# Patient Record
Sex: MALE | Race: WHITE | Employment: OTHER | URBAN - METROPOLITAN AREA
[De-identification: names, ages, dates, MRNs, and addresses within clinical notes are randomized per-mention and may not be internally consistent; named-entity substitution may affect disease eponyms.]

---

## 2019-02-12 ENCOUNTER — HOSPITAL ENCOUNTER (EMERGENCY)
Age: 79
Discharge: HOME | End: 2019-02-12
Payer: MEDICARE

## 2019-02-12 VITALS
TEMPERATURE: 98.1 F | OXYGEN SATURATION: 95 % | RESPIRATION RATE: 16 BRPM | WEIGHT: 220 LBS | SYSTOLIC BLOOD PRESSURE: 173 MMHG | DIASTOLIC BLOOD PRESSURE: 88 MMHG | BODY MASS INDEX: 28.23 KG/M2 | HEART RATE: 58 BPM | HEIGHT: 74 IN

## 2019-02-12 DIAGNOSIS — I10 ESSENTIAL HYPERTENSION: Primary | ICD-10-CM

## 2019-02-12 LAB
A/G RATIO: 1.2 (ref 1.1–2.2)
ALBUMIN SERPL-MCNC: 4.1 G/DL (ref 3.4–5)
ALP BLD-CCNC: 28 U/L (ref 40–129)
ALT SERPL-CCNC: 15 U/L (ref 10–40)
ANION GAP SERPL CALCULATED.3IONS-SCNC: 12 MMOL/L (ref 3–16)
AST SERPL-CCNC: 19 U/L (ref 15–37)
BASOPHILS ABSOLUTE: 0 K/UL (ref 0–0.2)
BASOPHILS RELATIVE PERCENT: 0.3 %
BILIRUB SERPL-MCNC: 0.5 MG/DL (ref 0–1)
BUN BLDV-MCNC: 17 MG/DL (ref 7–20)
CALCIUM SERPL-MCNC: 8.9 MG/DL (ref 8.3–10.6)
CHLORIDE BLD-SCNC: 104 MMOL/L (ref 99–110)
CO2: 22 MMOL/L (ref 21–32)
CREAT SERPL-MCNC: 0.9 MG/DL (ref 0.8–1.3)
EOSINOPHILS ABSOLUTE: 0 K/UL (ref 0–0.6)
EOSINOPHILS RELATIVE PERCENT: 0.6 %
GFR AFRICAN AMERICAN: >60
GFR NON-AFRICAN AMERICAN: >60
GLOBULIN: 3.3 G/DL
GLUCOSE BLD-MCNC: 106 MG/DL (ref 70–99)
HCT VFR BLD CALC: 45.2 % (ref 40.5–52.5)
HEMOGLOBIN: 15.3 G/DL (ref 13.5–17.5)
LYMPHOCYTES ABSOLUTE: 1.2 K/UL (ref 1–5.1)
LYMPHOCYTES RELATIVE PERCENT: 18.6 %
MCH RBC QN AUTO: 30.8 PG (ref 26–34)
MCHC RBC AUTO-ENTMCNC: 33.9 G/DL (ref 31–36)
MCV RBC AUTO: 90.6 FL (ref 80–100)
MONOCYTES ABSOLUTE: 0.4 K/UL (ref 0–1.3)
MONOCYTES RELATIVE PERCENT: 6.3 %
NEUTROPHILS ABSOLUTE: 5 K/UL (ref 1.7–7.7)
NEUTROPHILS RELATIVE PERCENT: 74.2 %
PDW BLD-RTO: 13.9 % (ref 12.4–15.4)
PLATELET # BLD: 192 K/UL (ref 135–450)
PMV BLD AUTO: 8.7 FL (ref 5–10.5)
POTASSIUM SERPL-SCNC: 4.1 MMOL/L (ref 3.5–5.1)
RBC # BLD: 4.99 M/UL (ref 4.2–5.9)
SODIUM BLD-SCNC: 138 MMOL/L (ref 136–145)
TOTAL PROTEIN: 7.4 G/DL (ref 6.4–8.2)
WBC # BLD: 6.7 K/UL (ref 4–11)

## 2019-02-12 PROCEDURE — 6370000000 HC RX 637 (ALT 250 FOR IP): Performed by: NURSE PRACTITIONER

## 2019-02-12 PROCEDURE — 80053 COMPREHEN METABOLIC PANEL: CPT

## 2019-02-12 PROCEDURE — 99284 EMERGENCY DEPT VISIT MOD MDM: CPT

## 2019-02-12 PROCEDURE — 93005 ELECTROCARDIOGRAM TRACING: CPT | Performed by: EMERGENCY MEDICINE

## 2019-02-12 PROCEDURE — 85025 COMPLETE CBC W/AUTO DIFF WBC: CPT

## 2019-02-12 RX ORDER — LISINOPRIL 10 MG/1
10 TABLET ORAL ONCE
Status: COMPLETED | OUTPATIENT
Start: 2019-02-12 | End: 2019-02-12

## 2019-02-12 RX ORDER — ACETAMINOPHEN 325 MG/1
650 TABLET ORAL ONCE
Status: COMPLETED | OUTPATIENT
Start: 2019-02-12 | End: 2019-02-12

## 2019-02-12 RX ORDER — LISINOPRIL 10 MG/1
10 TABLET ORAL DAILY
Qty: 30 TABLET | Refills: 0 | Status: SHIPPED | OUTPATIENT
Start: 2019-02-12

## 2019-02-12 RX ADMIN — LISINOPRIL 10 MG: 10 TABLET ORAL at 14:34

## 2019-02-12 RX ADMIN — ACETAMINOPHEN 650 MG: 325 TABLET ORAL at 14:34

## 2019-02-12 ASSESSMENT — PAIN SCALES - GENERAL
PAINLEVEL_OUTOF10: 0
PAINLEVEL_OUTOF10: 5
PAINLEVEL_OUTOF10: 5

## 2019-02-12 ASSESSMENT — PAIN DESCRIPTION - LOCATION: LOCATION: HEAD

## 2019-02-12 ASSESSMENT — PAIN DESCRIPTION - PAIN TYPE: TYPE: ACUTE PAIN

## 2019-02-13 LAB
EKG ATRIAL RATE: 65 BPM
EKG DIAGNOSIS: NORMAL
EKG P AXIS: 51 DEGREES
EKG P-R INTERVAL: 172 MS
EKG Q-T INTERVAL: 454 MS
EKG QRS DURATION: 108 MS
EKG QTC CALCULATION (BAZETT): 472 MS
EKG R AXIS: -33 DEGREES
EKG T AXIS: -25 DEGREES
EKG VENTRICULAR RATE: 65 BPM

## 2019-02-13 PROCEDURE — 93010 ELECTROCARDIOGRAM REPORT: CPT | Performed by: INTERNAL MEDICINE

## 2021-08-11 ENCOUNTER — APPOINTMENT (OUTPATIENT)
Dept: CT IMAGING | Age: 81
DRG: 641 | End: 2021-08-11
Payer: MEDICARE

## 2021-08-11 ENCOUNTER — HOSPITAL ENCOUNTER (INPATIENT)
Age: 81
LOS: 1 days | Discharge: HOME | DRG: 641 | End: 2021-08-12
Attending: EMERGENCY MEDICINE | Admitting: INTERNAL MEDICINE
Payer: MEDICARE

## 2021-08-11 ENCOUNTER — APPOINTMENT (OUTPATIENT)
Dept: GENERAL RADIOLOGY | Age: 81
DRG: 641 | End: 2021-08-11
Payer: MEDICARE

## 2021-08-11 DIAGNOSIS — R00.1 BRADYCARDIA: ICD-10-CM

## 2021-08-11 DIAGNOSIS — I71.21 ASCENDING AORTIC ANEURYSM: ICD-10-CM

## 2021-08-11 DIAGNOSIS — R11.2 NON-INTRACTABLE VOMITING WITH NAUSEA, UNSPECIFIED VOMITING TYPE: ICD-10-CM

## 2021-08-11 DIAGNOSIS — R55 NEAR SYNCOPE: Primary | ICD-10-CM

## 2021-08-11 DIAGNOSIS — I71.40 ABDOMINAL AORTIC ANEURYSM (AAA) WITHOUT RUPTURE: ICD-10-CM

## 2021-08-11 LAB
ANION GAP SERPL CALCULATED.3IONS-SCNC: 15 MMOL/L (ref 3–16)
BASOPHILS ABSOLUTE: 0 K/UL (ref 0–0.2)
BASOPHILS RELATIVE PERCENT: 0.5 %
BUN BLDV-MCNC: 24 MG/DL (ref 7–20)
CALCIUM SERPL-MCNC: 9.7 MG/DL (ref 8.3–10.6)
CHLORIDE BLD-SCNC: 104 MMOL/L (ref 99–110)
CO2: 19 MMOL/L (ref 21–32)
CREAT SERPL-MCNC: 1.3 MG/DL (ref 0.8–1.3)
EKG ATRIAL RATE: 53 BPM
EKG DIAGNOSIS: NORMAL
EKG P AXIS: 9 DEGREES
EKG P-R INTERVAL: 182 MS
EKG Q-T INTERVAL: 454 MS
EKG QRS DURATION: 110 MS
EKG QTC CALCULATION (BAZETT): 426 MS
EKG R AXIS: -26 DEGREES
EKG T AXIS: -3 DEGREES
EKG VENTRICULAR RATE: 53 BPM
EOSINOPHILS ABSOLUTE: 0.1 K/UL (ref 0–0.6)
EOSINOPHILS RELATIVE PERCENT: 1.8 %
GFR AFRICAN AMERICAN: >60
GFR NON-AFRICAN AMERICAN: 53
GLUCOSE BLD-MCNC: 122 MG/DL (ref 70–99)
HCT VFR BLD CALC: 46.4 % (ref 40.5–52.5)
HEMOGLOBIN: 16.1 G/DL (ref 13.5–17.5)
LYMPHOCYTES ABSOLUTE: 2 K/UL (ref 1–5.1)
LYMPHOCYTES RELATIVE PERCENT: 31.4 %
MAGNESIUM: 2 MG/DL (ref 1.8–2.4)
MCH RBC QN AUTO: 30.7 PG (ref 26–34)
MCHC RBC AUTO-ENTMCNC: 34.7 G/DL (ref 31–36)
MCV RBC AUTO: 88.6 FL (ref 80–100)
MONOCYTES ABSOLUTE: 0.6 K/UL (ref 0–1.3)
MONOCYTES RELATIVE PERCENT: 9.2 %
NEUTROPHILS ABSOLUTE: 3.7 K/UL (ref 1.7–7.7)
NEUTROPHILS RELATIVE PERCENT: 57.1 %
PDW BLD-RTO: 13.9 % (ref 12.4–15.4)
PLATELET # BLD: 230 K/UL (ref 135–450)
PMV BLD AUTO: 8.6 FL (ref 5–10.5)
POTASSIUM SERPL-SCNC: 3.8 MMOL/L (ref 3.5–5.1)
RBC # BLD: 5.24 M/UL (ref 4.2–5.9)
SODIUM BLD-SCNC: 138 MMOL/L (ref 136–145)
SPECIMEN STATUS: NORMAL
TROPONIN: <0.01 NG/ML
TROPONIN: <0.01 NG/ML
WBC # BLD: 6.5 K/UL (ref 4–11)

## 2021-08-11 PROCEDURE — 96374 THER/PROPH/DIAG INJ IV PUSH: CPT

## 2021-08-11 PROCEDURE — 1200000000 HC SEMI PRIVATE

## 2021-08-11 PROCEDURE — 85025 COMPLETE CBC W/AUTO DIFF WBC: CPT

## 2021-08-11 PROCEDURE — 80048 BASIC METABOLIC PNL TOTAL CA: CPT

## 2021-08-11 PROCEDURE — 84484 ASSAY OF TROPONIN QUANT: CPT

## 2021-08-11 PROCEDURE — 36415 COLL VENOUS BLD VENIPUNCTURE: CPT

## 2021-08-11 PROCEDURE — 2700000000 HC OXYGEN THERAPY PER DAY

## 2021-08-11 PROCEDURE — 6360000004 HC RX CONTRAST MEDICATION: Performed by: EMERGENCY MEDICINE

## 2021-08-11 PROCEDURE — 74174 CTA ABD&PLVS W/CONTRAST: CPT

## 2021-08-11 PROCEDURE — 2580000003 HC RX 258: Performed by: INTERNAL MEDICINE

## 2021-08-11 PROCEDURE — 99285 EMERGENCY DEPT VISIT HI MDM: CPT

## 2021-08-11 PROCEDURE — 6360000002 HC RX W HCPCS: Performed by: EMERGENCY MEDICINE

## 2021-08-11 PROCEDURE — 93005 ELECTROCARDIOGRAM TRACING: CPT | Performed by: EMERGENCY MEDICINE

## 2021-08-11 PROCEDURE — 71045 X-RAY EXAM CHEST 1 VIEW: CPT

## 2021-08-11 PROCEDURE — 83735 ASSAY OF MAGNESIUM: CPT

## 2021-08-11 PROCEDURE — 94761 N-INVAS EAR/PLS OXIMETRY MLT: CPT

## 2021-08-11 PROCEDURE — 2580000003 HC RX 258: Performed by: EMERGENCY MEDICINE

## 2021-08-11 PROCEDURE — 6360000002 HC RX W HCPCS: Performed by: INTERNAL MEDICINE

## 2021-08-11 PROCEDURE — 93010 ELECTROCARDIOGRAM REPORT: CPT | Performed by: INTERNAL MEDICINE

## 2021-08-11 RX ORDER — SODIUM CHLORIDE 9 MG/ML
INJECTION, SOLUTION INTRAVENOUS CONTINUOUS
Status: DISCONTINUED | OUTPATIENT
Start: 2021-08-11 | End: 2021-08-12 | Stop reason: HOSPADM

## 2021-08-11 RX ORDER — ACETAMINOPHEN 650 MG/1
650 SUPPOSITORY RECTAL EVERY 6 HOURS PRN
Status: DISCONTINUED | OUTPATIENT
Start: 2021-08-11 | End: 2021-08-12 | Stop reason: HOSPADM

## 2021-08-11 RX ORDER — 0.9 % SODIUM CHLORIDE 0.9 %
1000 INTRAVENOUS SOLUTION INTRAVENOUS ONCE
Status: COMPLETED | OUTPATIENT
Start: 2021-08-11 | End: 2021-08-11

## 2021-08-11 RX ORDER — ONDANSETRON 2 MG/ML
4 INJECTION INTRAMUSCULAR; INTRAVENOUS ONCE
Status: COMPLETED | OUTPATIENT
Start: 2021-08-11 | End: 2021-08-11

## 2021-08-11 RX ORDER — ONDANSETRON 4 MG/1
4 TABLET, ORALLY DISINTEGRATING ORAL EVERY 8 HOURS PRN
Status: DISCONTINUED | OUTPATIENT
Start: 2021-08-11 | End: 2021-08-12 | Stop reason: HOSPADM

## 2021-08-11 RX ORDER — ASPIRIN 81 MG/1
81 TABLET ORAL DAILY
Status: DISCONTINUED | OUTPATIENT
Start: 2021-08-11 | End: 2021-08-12 | Stop reason: HOSPADM

## 2021-08-11 RX ORDER — ACETAMINOPHEN 325 MG/1
650 TABLET ORAL EVERY 6 HOURS PRN
Status: DISCONTINUED | OUTPATIENT
Start: 2021-08-11 | End: 2021-08-12 | Stop reason: HOSPADM

## 2021-08-11 RX ORDER — FENOFIBRATE 54 MG/1
54 TABLET ORAL DAILY
Status: DISCONTINUED | OUTPATIENT
Start: 2021-08-11 | End: 2021-08-12 | Stop reason: HOSPADM

## 2021-08-11 RX ORDER — ONDANSETRON 2 MG/ML
4 INJECTION INTRAMUSCULAR; INTRAVENOUS EVERY 6 HOURS PRN
Status: DISCONTINUED | OUTPATIENT
Start: 2021-08-11 | End: 2021-08-12 | Stop reason: HOSPADM

## 2021-08-11 RX ORDER — POLYETHYLENE GLYCOL 3350 17 G/17G
17 POWDER, FOR SOLUTION ORAL DAILY PRN
Status: DISCONTINUED | OUTPATIENT
Start: 2021-08-11 | End: 2021-08-12 | Stop reason: HOSPADM

## 2021-08-11 RX ADMIN — SODIUM CHLORIDE 1000 ML: 9 INJECTION, SOLUTION INTRAVENOUS at 14:03

## 2021-08-11 RX ADMIN — IOPAMIDOL 75 ML: 755 INJECTION, SOLUTION INTRAVENOUS at 15:22

## 2021-08-11 RX ADMIN — SODIUM CHLORIDE: 9 INJECTION, SOLUTION INTRAVENOUS at 21:23

## 2021-08-11 RX ADMIN — ONDANSETRON 4 MG: 2 INJECTION INTRAMUSCULAR; INTRAVENOUS at 14:02

## 2021-08-11 RX ADMIN — ENOXAPARIN SODIUM 40 MG: 40 INJECTION SUBCUTANEOUS at 21:28

## 2021-08-11 ASSESSMENT — PAIN SCALES - GENERAL: PAINLEVEL_OUTOF10: 0

## 2021-08-11 NOTE — ED NOTES
Pt arrives from EMS for low HR and dizziness. Pt vomiting upon arrival. Pt states he feels dizzy like he is going to pass out. Pt denies any pain. Pt states nausea. Pt alert and oriented.       Petrona Alonso RN  08/11/21 7608

## 2021-08-11 NOTE — ED NOTES
Bed: 01  Expected date:   Expected time:   Means of arrival: [facility]  Comments:  Medic PStephaniOStephani [address], [facility]  08/11/21 2542

## 2021-08-11 NOTE — H&P
Hospital Medicine History & Physical      PCP: Carmencita Victor MD    Date of Admission: 8/11/2021    Date of Service: Pt seen/examined on 8/11/2021 and Admitted to Inpatient with expected LOS greater than two midnights due to medical therapy. Chief Complaint: Syncope      History Of Present Illness:  (    78 y.o. male who presented to [facility] with above complaint. He told he used to faint younger days after seeing unpleasant things like blood. Last syncopal episode was 20 years ago. While he was having lunch at outside he felt that he was going to pass out. Once he opened eyes he was almost getting transferred into the ambulance. Currently he does not have any headache dizziness facial asymmetry, focal neurological weakness chest pain palpitation nausea vomiting or diarrhea. But he vomited once in the ER. Past Medical History:          Diagnosis Date    Hyperlipidemia        Past Surgical History:          Procedure Laterality Date    COLONOSCOPY  2008    COLONOSCOPY  11/13    no specimen       Medications Prior to Admission:      Prior to Admission medications    Medication Sig Start Date End Date Taking? Authorizing Provider   lisinopril (PRINIVIL;ZESTRIL) 10 MG tablet Take 1 tablet by mouth daily 2/12/19   Daniela Bar APRN - CNP   fenofibrate (TRICOR) 145 MG tablet Take 145 mg by mouth daily. Historical Provider, MD   Omega-3 Fatty Acids (FISH OIL) 1200 MG CAPS Take 2 tablets by mouth 2 times daily. Historical Provider, MD   Glucosamine 750 MG TABS Take 1 tablet by mouth 2 times daily. Historical Provider, MD   Multiple Vitamins-Minerals (CENTRUM SILVER PO) Take 1 tablet by mouth daily. Historical Provider, MD   Cholecalciferol (VITAMIN D3) 3000 UNITS TABS Take 1 tablet by mouth daily. Historical Provider, MD   Loratadine 10 MG CAPS Take 1 capsule by mouth daily. Historical Provider, MD   aspirin 81 MG EC tablet Take 81 mg by mouth daily.     Historical Provider, MD       Allergies:  Patient has no known allergies. Social History:      The patient currently lives at home with wife    TOBACCO:   reports that he has never smoked. He does not have any smokeless tobacco history on file. ETOH:   reports no history of alcohol use. E-Cigarettes/Vaping Use     Questions Responses    E-Cigarette/Vaping Use     Start Date     Passive Exposure     Quit Date     Counseling Given     Comments             Family History:      Reviewed in detail and negative for DM, CAD, Cancer, CVA. Positive as follows:        Problem Relation Age of Onset   [facility] Cancer Mother         colon ca       REVIEW OF SYSTEMS COMPLETED:   Pertinent positives as noted in the HPI. All other systems reviewed and negative. PHYSICAL EXAM PERFORMED:    BP (!) 147/130   Pulse 69   Temp 97.5 °F (36.4 °C) (Oral)   Resp 14   Ht 6' 2\" (1.88 m)   Wt 200 lb (90.7 kg)   SpO2 98%   BMI 25.68 kg/m²     General appearance:  No apparent distress, appears stated age and cooperative. HEENT:  Normal cephalic, atraumatic without obvious deformity. Pupils equal, round, and reactive to light. Extra ocular muscles intact. Conjunctivae/corneas clear. Neck: Supple, with full range of motion. No jugular venous distention. Trachea midline. Respiratory:  Normal respiratory effort. Reduced air entry, bilaterally without Rales/Wheezes/Rhonchi. Cardiovascular:  Regular rate and rhythm with normal S1/S2 without murmurs, rubs or gallops. Abdomen: Soft, non-tender, non-distended with normal bowel sounds. Musculoskeletal:  No clubbing, cyanosis or edema bilaterally. Full range of motion without deformity. Skin: Skin color, texture, turgor normal.  No rashes or lesions. Neurologic:  Neurovascularly intact without any focal sensory/motor deficits.  Cranial nerves: II-XII intact, grossly non-focal.  Psychiatric:  Alert and oriented,   Capillary Refill: Brisk,3 seconds, normal  Peripheral Pulses: +2 palpable, equal bilaterally       Labs:     Recent Labs     08/11/21  1403   WBC 6.5   HGB 16.1   HCT 46.4        Recent Labs     08/11/21  1403      K 3.8      CO2 19*   BUN 24*   CREATININE 1.3   CALCIUM 9.7     No results for input(s): AST, ALT, BILIDIR, BILITOT, ALKPHOS in the last 72 hours. No results for input(s): INR in the last 72 hours. Recent Labs     08/11/21  1403   TROPONINI <0.01       Urinalysis:    No results found for: [place], BACTERIA, RBCUA, BLOODU, [address], GLUCOSEU    Radiology:     CXR: I have reviewed the CXR with the following interpretation:   EKG:  I have reviewed the EKG with the following interpretation: Bradycardia 53/min no acute ST changes    CTA CHEST ABDOMEN PELVIS W CONTRAST   Final Result   No aortic dissection. 4.1 cm saccular aneurysm of the abdominal aorta just above the bifurcation. 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 Dilation of the ascending aorta up to 4.7 cm      Recommend vascular surgical consultation for the above. XR CHEST PORTABLE   Final Result   No evidence of an acute cardiopulmonary process.              ASSESSMENT:    Active Hospital Problems    Diagnosis Date Noted    Syncope, near [R55] 08/11/2021         PLAN:  Syncope-possibly vasovagal and or  orthostasis/ cardio neurogenic causes need to be ruled out-admit, telemetry, troponin, echocardiogram, orthostasis, IV fluids, cardiology was consulted in the emergency room  Aspirin    Incidental finding of dilatation of ascending aorta, focal stenosis of proximal right subclavian artery and saccular aneurysm of abdominal aorta just above the bifurcation-vascular surgery was consulted from emergency room, input appreciated, all of the above were chronic and can be followed up as an outpatient    Acute kidney injury-creatinine 1.3 normally is less than 1-possibly prerenal hold nephrotoxic medications IV fluids and monitor BMP    Dyslipidemia-TriCor          DVT Prophylaxis: Lovenox  Diet: Low-cholesterol  Code Status: Full code    PT/OT Eval Status:     Dispo -admitted to [facility] 5 telemetry       Tena Landeros MD    Thank you Viky Hamilton MD for the opportunity to be involved in this patient's care. If you have any questions or concerns please feel free to contact me at [phone].

## 2021-08-11 NOTE — ED NOTES
Called vascular @ 1631   Re: aortic aneurysm.  near syncope   @ [address] [place] [address]  08/11/21 1522

## 2021-08-11 NOTE — ED PROVIDER NOTES
CHIEF COMPLAINT  Bradycardia (Pt reports he was eating lunch and felt sudden \"light headedness\" became sweaty and pale per family called EMS. Per EMS HR 45) and Nausea & Vomiting      HISTORY OF PRESENT ILLNESS  Clive Lloyd is a 78 y.o. male with a history of hyperlipidemia who presents to the ED complaining of near syncope. Patient was sitting eating lunch when he suddenly became extremely lightheaded, sweaty, and pale. Per EMS, heart rate was 45. Patient denies chest pain, or shortness of breath. No palpitations noted. No recent changes in medication. Patient had an episode of vomiting shortly after arrival in the emergency department. .   No other complaints, modifying factors or associated symptoms. I have reviewed the following from the nursing documentation. Past Medical History:   Diagnosis Date    Hyperlipidemia      Past Surgical History:   Procedure Laterality Date    COLONOSCOPY  2008    COLONOSCOPY  11/13    no specimen     Family History   Problem Relation Age of Onset    Cancer Mother         colon ca     Social History     Socioeconomic History    Marital status:      Spouse name: Not on file    Number of children: Not on file    Years of education: Not on file    Highest education level: Not on file   Occupational History    Not on file   Tobacco Use    Smoking status: Never Smoker   Substance and Sexual Activity    Alcohol use: No    Drug use: No    Sexual activity: Not on file   Other Topics Concern    Not on file   Social History Narrative    Not on file     Social Determinants of Health     Financial Resource Strain:     Difficulty of Paying Living Expenses:    Food Insecurity:     Worried About [address] in the Last Year:     [address] in the Last Year:    Transportation Needs:     Lack of Transportation (Medical):      Lack of Transportation (Non-Medical):    Physical Activity:     Days of Exercise per Week:     Minutes of Exercise per Session:    Stress:     Feeling of Stress :    Social Connections:     Frequency of Communication with Friends and Family:     Frequency of Social Gatherings with Friends and Family:     Attends Synagogue Services:     Active Member of Clubs or Organizations:     Attends Club or Organization Meetings:     Marital Status:    Intimate Partner Violence:     Fear of Current or Ex-Partner:     Emotionally Abused:     Physically Abused:     Sexually Abused:      Current Facility-Administered Medications   Medication Dose Route Frequency Provider Last Rate Last Admin    0.9 % sodium chloride bolus  1,000 mL Intravenous Once Jose De Jesus Bedoya DO 1,000 mL/hr at 08/11/21 1403 1,000 mL at 08/11/21 1403     Current Outpatient Medications   Medication Sig Dispense Refill    lisinopril (PRINIVIL;ZESTRIL) 10 MG tablet Take 1 tablet by mouth daily 30 tablet 0    fenofibrate (TRICOR) 145 MG tablet Take 145 mg by mouth daily.  Omega-3 Fatty Acids (FISH OIL) 1200 MG CAPS Take 2 tablets by mouth 2 times daily.  Glucosamine 750 MG TABS Take 1 tablet by mouth 2 times daily.  Multiple Vitamins-Minerals (CENTRUM SILVER PO) Take 1 tablet by mouth daily.  Cholecalciferol (VITAMIN D3) 3000 UNITS TABS Take 1 tablet by mouth daily.  Loratadine 10 MG CAPS Take 1 capsule by mouth daily.  aspirin 81 MG EC tablet Take 81 mg by mouth daily. No Known Allergies    REVIEW OF SYSTEMS  10 systems reviewed, pertinent positives per HPI otherwise noted to be negative. PHYSICAL EXAM  BP (!) 154/81   Pulse 54   Temp 97.5 °F (36.4 °C) (Oral)   Resp 16   Ht 6' 2\" (1.88 m)   Wt 200 lb (90.7 kg)   SpO2 95%   BMI 25.68 kg/m²   GENERAL APPEARANCE: Awake and alert. Cooperative. No acute distress. HEAD: Normocephalic. Atraumatic. EYES: PERRL. EOM's grossly intact. Test skew within normal limits. .  ENT: Mucous membranes are moist.   NECK: Supple, trachea midline. HEART: Bradycardic. Benedetta Ou Normal S1, S2.  No murmurs, rubs or gallops. LUNGS: Respirations unlabored. CTAB. Good air exchange. No wheezes, rales, or rhonchi. Speaking comfortably in full sentences. ABDOMEN: Soft. Non-distended. Non-tender. No guarding or rebound. Normal Bowel sounds. EXTREMITIES: No peripheral edema. MAEE. No acute deformities. SKIN: Warm and dry. No acute rashes. NEUROLOGICAL: Alert and oriented X 3. CN II-XII intact. No gross facial drooping. Strength 5/5, sensation intact. No pronator drift. Normal coordination. Gait not tested. [place] [facility]: Normal mood and affect. LABS  I have reviewed all labs for this visit.    Results for orders placed or performed during the hospital encounter of 08/11/21   CBC Auto Differential   Result Value Ref Range    WBC 6.5 4.0 - 11.0 K/uL    RBC 5.24 4.20 - 5.90 M/uL    Hemoglobin 16.1 13.5 - 17.5 g/dL    Hematocrit 46.4 40.5 - 52.5 %    MCV 88.6 80.0 - 100.0 fL    MCH 30.7 26.0 - 34.0 pg    MCHC 34.7 31.0 - 36.0 g/dL    RDW 13.9 12.4 - 15.4 %    Platelets 712 812 - 913 K/uL    MPV 8.6 5.0 - 10.5 fL    Neutrophils % 57.1 %    Lymphocytes % 31.4 %    Monocytes % 9.2 %    Eosinophils % 1.8 %    Basophils % 0.5 %    Neutrophils Absolute 3.7 1.7 - 7.7 K/uL    Lymphocytes Absolute 2.0 1.0 - 5.1 K/uL    Monocytes Absolute 0.6 0.0 - 1.3 K/uL    Eosinophils Absolute 0.1 0.0 - 0.6 K/uL    Basophils Absolute 0.0 0.0 - 0.2 K/uL   Basic Metabolic Panel   Result Value Ref Range    Sodium 138 136 - 145 mmol/L    Potassium 3.8 3.5 - 5.1 mmol/L    Chloride 104 99 - 110 mmol/L    CO2 19 (L) 21 - 32 mmol/L    Anion Gap 15 3 - 16    Glucose 122 (H) 70 - 99 mg/dL    BUN 24 (H) 7 - 20 mg/dL    CREATININE 1.3 0.8 - 1.3 mg/dL    GFR Non- 53 (A) >60    GFR African American >60 >60    Calcium 9.7 8.3 - 10.6 mg/dL   Magnesium   Result Value Ref Range    Magnesium 2.00 1.80 - 2.40 mg/dL   Troponin   Result Value Ref Range    Troponin <0.01 <0.01 ng/mL   Sample possible blood bank testing   Result Value Ref Range    Specimen Status HIEU    Troponin   Result Value Ref Range    Troponin <0.01 <0.01 ng/mL   EKG 12 Lead   Result Value Ref Range    Ventricular Rate 53 BPM    Atrial Rate 53 BPM    P-R Interval 182 ms    QRS Duration 110 ms    Q-T Interval 454 ms    QTc Calculation (Bazett) 426 ms    P Axis 9 degrees    R Axis -26 degrees    T Axis -3 degrees    Diagnosis       Baseline wanderSinus bradycardia with occasional Premature ventricular complexesIncomplete right bundle branch blockMinimal voltage criteria for LVH, may be normal variantMarked T wave abnormality, consider lateral ischemiaAbnormal ECGWhen compared with ECG of 12-FEB-2019 12:49,Premature ventricular complexes are now PresentConfirmed by Tahmina Bahena ([id number]) on 8/11/2021 5:25:08 PM         EKG  Sinus bradycardia with occasional PVCs. Rate of 53. Normal axis. Normal intervals and durations. Incomplete right bundle branch block noted. LVH noted. No significant change when compared to previous EKG on 2/12/2019. Cardiac Monitoring: Bradycardic. RADIOLOGY  X-RAYS:  I have reviewed radiologic plain film image(s). ALL OTHER NON-PLAIN FILM IMAGES SUCH AS CT, ULTRASOUND AND MRI HAVE BEEN READ BY THE RADIOLOGIST. CTA CHEST ABDOMEN PELVIS W CONTRAST   Final Result   No aortic dissection. 4.1 cm saccular aneurysm of the abdominal aorta just above the bifurcation. 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 Dilation of the ascending aorta up to 4.7 cm      Recommend vascular surgical consultation for the above. XR CHEST PORTABLE   Final Result   No evidence of an acute cardiopulmonary process. Rechecks: Physical assessment performed. Patient continued to be mildly bradycardic throughout his stay. Blood pressures remained stable. ED COURSE/MDM  Patient seen and evaluated. Old records reviewed. Labs and imaging reviewed and results discussed with patient.  Patient was given saline and Zofran in the ED with good symptomatic relief. Patient was reassessed as noted above . Patient will be admitted for further evaluation and treatment. Vascular surgery, cardiothoracic surgery, and cardiology were contacted and will follow patient in house. . Plan of care discussed with patient and family. Patient and family in agreement with plan. Patient was given scripts for the following medications. I counseled patient how to take these medications. Current Discharge Medication List          CLINICAL IMPRESSION  1. Near syncope    2. Bradycardia    3. Non-intractable vomiting with nausea, unspecified vomiting type    4. Ascending aortic aneurysm ([address]    5. Abdominal aortic aneurysm (AAA) without rupture (HCC)        Blood pressure (!) 155/79, pulse 60, temperature 98.5 °F (36.9 °C), temperature source Oral, resp. rate 18, height 6' 2\" (1.88 m), weight 200 lb (90.7 kg), SpO2 97 %. DISPOSITION  Berlin Quezada was admitted in stable condition.         Yanely Jaffe DO  08/11/21 3267

## 2021-08-11 NOTE — PROGRESS NOTES
Vascular    CTA personally reviewed. R Subclavian stenosis due to kink in vessel secondary to tortuosity. Dilatation of Ascending aorta. No stenosis of arch or descending aorta. Saccular aneurysm of distal aorta just above aortic bifurcation. These findings appear to be chronic. Patient can see me in office as oupt to discuss treatment of distal aortic aneurysm.

## 2021-08-11 NOTE — ED TRIAGE NOTES
Pt had episode of emesis large amount brown colored upon arrival. Patient reports being at [employer] eating chili and felt \"feeling like I am going to pass out\" per witnesses patient became pale and diaphoretic. En route by EMS pt became nauseated. Per EMS HR was 45 on monitor. Pt denies any pain.

## 2021-08-11 NOTE — ED NOTES
Called CT @ 4925   Re: aortic aneurysm/near syncope  Dr. Geovanni Dodson @ [address] [facility]  08/11/21 9623

## 2021-08-11 NOTE — ED NOTES
Called cards @ 4867   Re: near syncope/bradycardia  Dr. Tej Ty @ [address] [facility]  08/11/21 8408

## 2021-08-11 NOTE — ED NOTES
Pt daughter David Hunt updated on pt condition per pt request.      Griselda Grande RN  08/11/21 6135

## 2021-08-12 VITALS
DIASTOLIC BLOOD PRESSURE: 88 MMHG | WEIGHT: 195.8 LBS | TEMPERATURE: 97.6 F | HEIGHT: 74 IN | RESPIRATION RATE: 16 BRPM | SYSTOLIC BLOOD PRESSURE: 161 MMHG | HEART RATE: 65 BPM | BODY MASS INDEX: 25.13 KG/M2 | OXYGEN SATURATION: 98 %

## 2021-08-12 DIAGNOSIS — I71.21 ASCENDING AORTIC ANEURYSM: Primary | ICD-10-CM

## 2021-08-12 LAB
ANION GAP SERPL CALCULATED.3IONS-SCNC: 8 MMOL/L (ref 3–16)
BUN BLDV-MCNC: 19 MG/DL (ref 7–20)
CALCIUM SERPL-MCNC: 8.8 MG/DL (ref 8.3–10.6)
CHLORIDE BLD-SCNC: 107 MMOL/L (ref 99–110)
CO2: 23 MMOL/L (ref 21–32)
CREAT SERPL-MCNC: 1.1 MG/DL (ref 0.8–1.3)
GFR AFRICAN AMERICAN: >60
GFR NON-AFRICAN AMERICAN: >60
GLUCOSE BLD-MCNC: 97 MG/DL (ref 70–99)
LV EF: 55 %
LVEF MODALITY: NORMAL
POTASSIUM REFLEX MAGNESIUM: 3.8 MMOL/L (ref 3.5–5.1)
SODIUM BLD-SCNC: 138 MMOL/L (ref 136–145)
TROPONIN: <0.01 NG/ML

## 2021-08-12 PROCEDURE — 97110 THERAPEUTIC EXERCISES: CPT

## 2021-08-12 PROCEDURE — 80048 BASIC METABOLIC PNL TOTAL CA: CPT

## 2021-08-12 PROCEDURE — 99223 1ST HOSP IP/OBS HIGH 75: CPT | Performed by: INTERNAL MEDICINE

## 2021-08-12 PROCEDURE — 97166 OT EVAL MOD COMPLEX 45 MIN: CPT

## 2021-08-12 PROCEDURE — 97535 SELF CARE MNGMENT TRAINING: CPT

## 2021-08-12 PROCEDURE — 6360000002 HC RX W HCPCS: Performed by: INTERNAL MEDICINE

## 2021-08-12 PROCEDURE — 36415 COLL VENOUS BLD VENIPUNCTURE: CPT

## 2021-08-12 PROCEDURE — C8923 2D TTE W OR W/O FOL W/CON,CO: HCPCS

## 2021-08-12 PROCEDURE — 99223 1ST HOSP IP/OBS HIGH 75: CPT | Performed by: THORACIC SURGERY (CARDIOTHORACIC VASCULAR SURGERY)

## 2021-08-12 PROCEDURE — 6370000000 HC RX 637 (ALT 250 FOR IP): Performed by: INTERNAL MEDICINE

## 2021-08-12 PROCEDURE — 97161 PT EVAL LOW COMPLEX 20 MIN: CPT

## 2021-08-12 RX ADMIN — FENOFIBRATE 54 MG: 54 TABLET ORAL at 10:19

## 2021-08-12 RX ADMIN — ASPIRIN 81 MG: 81 TABLET, COATED ORAL at 10:19

## 2021-08-12 RX ADMIN — ENOXAPARIN SODIUM 40 MG: 40 INJECTION SUBCUTANEOUS at 10:19

## 2021-08-12 ASSESSMENT — PAIN SCALES - GENERAL
PAINLEVEL_OUTOF10: 0
PAINLEVEL_OUTOF10: 0

## 2021-08-12 NOTE — DISCHARGE SUMMARY
Hospital Medicine Discharge Summary    Patient ID: Joleen Alpers      Patient's PCP: Jess Woodall MD    Admit Date: 8/11/2021     Discharge Date:   08/12/21     Admitting Physician: Bruce Rivera MD     Discharge Physician: Jax Sorto MD     Discharge Diagnoses: Active Hospital Problems    Diagnosis     Syncope, near [R55]        The patient was seen and examined on day of discharge and this discharge summary is in conjunction with any daily progress note from day of discharge. Hospital Course:  \"78 y.o. male who presented to [facility] with syncope. He told he used to faint younger days after seeing unpleasant things like blood. Last syncopal episode was 20 years ago. While he was having lunch at outside he felt that he was going to pass out. Once he opened eyes he was almost getting transferred into the ambulance. \"      Syncope. Perhaps due to dehydration and sitting out in the 90-degree heat. He had some prerenal VICKIE which resolved with IVFs. Orthostats were then negative. He will drink more fluids from now on.    - no events on tele. His HR isn't low enough to significantly affect cerebral perfusion. TTE is pending at the time of this note. I see that cardiology was consulted as well. - he has no dysuria or other localizing symptoms of infection  - no focal neurological deficits    Incidental vascular findings on CT: dilation of ascending aorta, stenosis of R subclavian artery, and a distal AAA. Vascular surgery reviewed and will f/u as outpatient to discuss possible treatment of the distal AAA. OK to continue his lisinopril for HTN. I see that he has HLD and is on fenofibrate. Continue for now, defer to outpatient management.          Physical Exam Performed:     /77   Pulse 59   Temp 97.6 °F (36.4 °C) (Oral)   Resp 16   Ht 6' 2\" (1.88 m)   Wt 195 lb 12.8 oz (88.8 kg)   SpO2 95%   BMI 25.14 kg/m²       General appearance:  No apparent distress, appears stated age and cooperative. HEENT:  Normal cephalic, atraumatic without obvious deformity. Pupils equal, round, and reactive to light. Extra ocular muscles intact. Conjunctivae/corneas clear. Neck: Supple, with full range of motion. No jugular venous distention. Trachea midline. Respiratory:  Normal respiratory effort. Clear to auscultation, bilaterally without Rales/Wheezes/Rhonchi. Cardiovascular:  Mildly bradycardic rate and regular rhythm with normal S1/S2 without murmurs, rubs or gallops. Abdomen: Soft, non-tender, non-distended with normal bowel sounds. Musculoskeletal:  No clubbing, cyanosis or edema bilaterally. Full range of motion without deformity. Skin: Skin color, texture, turgor normal.  No rashes or lesions. Neurologic:  Neurovascularly intact without any focal sensory/motor deficits. Cranial nerves: II-XII intact, grossly non-focal.  Psychiatric:  Alert and oriented, thought content appropriate, normal insight  Capillary Refill: Brisk,< 3 seconds   Peripheral Pulses: +2 palpable, equal bilaterally       Labs: For convenience and continuity at follow-up the following most recent labs are provided:      CBC:    Lab Results   Component Value Date    WBC 6.5 08/11/2021    HGB 16.1 08/11/2021    HCT 46.4 08/11/2021     08/11/2021       Renal:    Lab Results   Component Value Date     08/12/2021    K 3.8 08/12/2021     08/12/2021    CO2 23 08/12/2021    BUN 19 08/12/2021    CREATININE 1.1 08/12/2021    CALCIUM 8.8 08/12/2021         Significant Diagnostic Studies    Radiology:   CTA CHEST ABDOMEN PELVIS W CONTRAST   Final Result   No aortic dissection. 4.1 cm saccular aneurysm of the abdominal aorta just above the bifurcation. 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 Dilation of the ascending aorta up to 4.7 cm      Recommend vascular surgical consultation for the above.          XR CHEST PORTABLE   Final

## 2021-08-12 NOTE — PROGRESS NOTES
Patient discharged home. avs given. Patient educated using teach back method. Patient taken to main entrance via w/c and picked up by family.

## 2021-08-12 NOTE — CARE COORDINATION
CM met with pt to discuss any dc needs. Pt had declined earlier. Pt states his daughter is flying into town to stay with them for a while. He stated they also have [facility] that comes out and he adjusted his schedule with them. He is not interested in home care at this time. Pt's family will transport home.

## 2021-08-12 NOTE — PLAN OF CARE
Orthostatics are documented as:     8/11/2021 at 14:24:    /75 HR 50 (lying)  /84 HR 49 (sitting)  /84 HR 51 (standing)    8/12/2021 at 3:01am:   /75 HR 51 (lying)  /85 HR 52 (sitting)  /83 HR 57 (standing)    Axle Gilliland, PANFILO-RICHARD  [facility]  [phone]

## 2021-08-12 NOTE — PROGRESS NOTES
Occupational Therapy   Occupational Therapy Initial Assessment and Treatment Note 1x  Date: 2021   Patient Name: Mercedes Rivera  MRN: [id number]     : 1940    Date of Service: 2021    Discharge Recommendations:  Home with assist PRN     Assessment   Assessment: OT eval completed. Pt admitted for bradycardia. Vitals were monitored throughout session. Pt demo'd Ind level of function for self-care and mobility during OT today. No device was used and balance was intact for ADLs. He had no c/o's pain or fatigue. No further OT needed. Decision Making: Medium Complexity  OT Education: OT Role;Energy Conservation  Disease Specific Education: Pt educated on importance of OOB mobility, prevention of complications of bedrest, and general safety during hospitalization. Pt verbalized understanding  REQUIRES OT FOLLOW UP: Yes  Activity Tolerance  Activity Tolerance: Patient Tolerated treatment well  Safety Devices  Safety Devices in place: Yes  Type of devices: Call light within reach;Nurse notified; Left in bed         Patient Diagnosis(es): The primary encounter diagnosis was Near syncope. Diagnoses of Bradycardia, Non-intractable vomiting with nausea, unspecified vomiting type, Ascending aortic aneurysm (HCC), and Abdominal aortic aneurysm (AAA) without rupture (HCC) were also pertinent to this visit. has a past medical history of Hyperlipidemia. has a past surgical history that includes Colonoscopy () and Colonoscopy ().        Restrictions  Restrictions/Precautions  Restrictions/Precautions: General Precautions, Up as Tolerated  Position Activity Restriction  Other position/activity restrictions: low fall risk    Subjective   General  Chart Reviewed: Yes  Patient assessed for rehabilitation services?: Yes  Family / Caregiver Present: Yes (brother)  Referring Practitioner: DERREK Rodriugez  Diagnosis: bradycardia  Subjective  Subjective: Pt agreeable to OT  General Comment  Comments: RN approved

## 2021-08-12 NOTE — PROGRESS NOTES
Physical Therapy    Facility/Department: [facility] B3 - MED SURG  Initial Assessment/ Discharge    NAME: Yadi Whyte  : 1940  MRN: [id number]    Date of Service: 2021    Discharge Recommendations:  Home with assist PRN   PT Equipment Recommendations  Equipment Needed: No    Assessment   Assessment: 77 yo male adm wtih bradycardia. PLOF Indep amb and ADLs. Today pt demonstrates baseline independence with stable vital signs. No additional PT planned. Pt voices understanding of all instructions. Prognosis: Excellent  Decision Making: Low Complexity  PT Education: Goals;PT Role;Plan of Care;General Safety; Disease Specific Education  Patient Education: Disease specific: Pt educated in role of therapy, prevention of complications of bedrest, benefits of mobility. Pt voices understanding  Barriers to Learning: none  REQUIRES PT FOLLOW UP: No  Activity Tolerance  Activity Tolerance: Patient Tolerated treatment well  Activity Tolerance: Post amb BP  165/84  HR 62  SpO2 98%       Patient Diagnosis(es): The primary encounter diagnosis was Near syncope. Diagnoses of Bradycardia, Non-intractable vomiting with nausea, unspecified vomiting type, Ascending aortic aneurysm (HCC), and Abdominal aortic aneurysm (AAA) without rupture (HCC) were also pertinent to this visit. has a past medical history of Hyperlipidemia. has a past surgical history that includes Colonoscopy () and Colonoscopy ().     Restrictions  Restrictions/Precautions: General Precautions, Up as Tolerated  Other position/activity restrictions: low fall risk  Vision/Hearing  Vision: Impaired  Vision Exceptions: Wears glasses for reading  Hearing: Within functional limits     Subjective  Chart Reviewed: Yes  Patient assessed for rehabilitation services?: Yes  Family / Caregiver Present: Yes (brother)  Referring Practitioner: Jennifer  Referral Date : 21  Diagnosis: Bradycardia  Follows Commands: Within Functional Limits  Comments: RN cleared pt for therapy, pt resting in bed on approach  Subjective  Subjective: pt agrees to therapy, pt voices understanding of all instructions  Patient Currently in Pain: Denies  Vital Signs upon approach  Pulse: 65  Heart Rate Source: Monitor  BP: (!) 161/88  BP Location: Right upper arm  Patient Currently in Pain: Denies  Oxygen Therapy  SpO2: 98 %   Orientation  Overall Orientation Status: Within Normal Limits  Social/Functional History  Social/Functional History  Lives With: Alone (Wife has dementia and receives home healthcare. She is ambulatory.)  Type of Home: House  Home Layout: One level  Home Access: Stairs to enter with rails  Entrance Stairs - Number of Steps: 1 with one handrail  Bathroom Shower/Tub: Walk-in shower  Bathroom Toilet: Handicap height  Bathroom Equipment: Grab bars in shower  Home Equipment:  (no DME)  ADL Assistance: Independent  Homemaking Responsibilities: No (Has cleaning services. Home care assists with meal prep or he gets take-out meals.)  Ambulation Assistance: Independent (no device)  Transfer Assistance: Independent  Active : Yes  Occupation: Retired  Additional Comments: No falls in past 3 months    Objective     RLE AROM: WFL  LLE AROM : WFL  Strength RLE: WFL  Strength LLE: WFL        Bed mobility  Supine to Sit: Modified independent  Sit to Supine: Modified independent  Transfers  Sit to Stand: Independent  Stand to sit:  Independent  Ambulation  Surface: level tile  Device: No Device  Assistance: Independent  Quality of Gait: Recipricol pattern, appropriate stride/pace/foot clearance, no loss of balance     Balance  Comments: pt able to turn in [place], bend as if to  object from floor and reach overhead without loss of balance  Exercises  Hip Flexion: Standing slow march 10 x B  Hip Extension/Leg Presses: Sit to and from stand 5 x in succession without use of UEs withing 15 sec  Ankle Pumps: Standing B heel raise 10 x     Plan   Times per week: 1 x only  Current Treatment Recommendations: Safety Education & Training  Safety Devices  Type of devices:  All fall risk precautions in place, Nurse notified, Call light within reach, Left in bed      AM-PAC Score  AM-PAC Inpatient Mobility Raw Score : 24 (08/12/21 1518)  AM-PAC Inpatient T-Scale Score : 61.14 (08/12/21 1518)  Mobility Inpatient CMS 0-100% Score: 0 (08/12/21 1518)  Mobility Inpatient CMS G-Code Modifier : [address] (08/12/21 1518)     Goals  Short term goals  Time Frame for Short term goals: 1 visit  Short term goal 1: pt to voice understanding of general safety, role of therapy, meaning of vital signs- MET  Short term goal 2: Pt to demonstrate indep bed mob, transfers, ambulation 150 ft no device with stable vital signs- MET  Patient Goals   Patient goals : No functional goals stated       Therapy Time   Individual Concurrent Group Co-treatment   Time In 1400         Time Out 1418         Minutes treatment Marixa Loza, PT

## 2021-08-12 NOTE — CARE COORDINATION
CM met with pt at bedside. From home with wife. Plans to return. IPTA. No needs for dc. Family to transport. Contact CM if needs arise.

## 2021-08-12 NOTE — CONSULTS
at  the time and has remained so. PAST MEDICAL HISTORY:  Hyperlipidemia. MEDICATIONS:  At the time of admission include aspirin 81 mg p.o. daily,  fenofibrate 145 mg p.o. daily, lisinopril 10 mg p.o. daily and fish oil  1200 mg p.o. b.i.d. ALLERGIES:  None known. SOCIAL HISTORY:  The patient does not smoke cigarettes. He does not  consume alcohol at this time. FAMILY HISTORY:  Remarkable for cancer in the mother. There is no known  family history of cardiac rhythm disturbance, syncope or sudden cardiac  death. REVIEW OF SYSTEMS:  Demonstrates no recent change in appetite or change  in weight. The patient has not had recent fever or chills. He does not  have chest pain. He does not describe palpitations. He has not had  previous episodes syncope other than those which were very remote. His  exercise tolerance is good. His functional status remains excellent. All other components of the 14-system review are negative. PHYSICAL EXAMINATION:  VITAL SIGNS:  Blood pressure is 139/77, heart rate is 59 beats per  minute and regular. The patient is afebrile. GENERAL:  He is awake, alert, oriented and in no discomfort. HEENT:  Exam demonstrates normocephalic, atraumatic head. There is no  scleral icterus. Pupils are round and reactive. NECK:  Supple without thyromegaly. There is no cervical  lymphadenopathy. LUNGS:  Clear to auscultation. CARDIOVASCULAR:  Exam reveals a regular rhythm. The apical impulse is  discrete. S1, S2 are normal.  The jugular venous pressure is within  normal limits. ABDOMEN:  Lean, soft, and nontender. EXTREMITIES:  Demonstrate no pitting pretibial dependent edema. There  is no cyanosis. There is no evidence for chronic venous stasis. SKIN:  Otherwise, warm and dry without skin rash. DIAGNOSTIC DATA:  A 12-lead ECG interpreted by me demonstrates sinus  bradycardia at 53 beats per minute.   There is early precordial R-wave  transition, a single PVC of right bundle superior axis is identified. There are minor nonspecific T-wave abnormalities. IMPRESSION:  1. Syncope. 2.  Hyperlipidemia. The patient presents following an episode of syncope. He had prolonged  prodrome which lasted for approximately 5 minutes. Unfortunately vital  signs from the scene were not obtained or have not been retained. Based  on his history of what sound like vasodepressor events and a prolonged  prodrome as well as the recovery period, which included fatigue,  diaphoresis and nausea and vomiting, it is likely that this does  represent a vasodepressor event. For individuals without myocardial  scar by ECG and with normal left ventricular function by echo,  vasodepressor episodes are the most common cause of otherwise  unexplained syncope. 2D echocardiogram will be obtained, if his left  ventricular function is normal, vasodepressor syncope will be the  putative diagnosis. Since his episodes are very rare and are preceded  by prolonged and recognizable prodrome. Avoidance type behaviors will  likely be recommended. This will include avoid dehydration, avoid  standing for prolonged periods time, and assuming the spine position  promptly once the typical prodrome is recognized. Pharmacologic therapy  is really necessary in this setting. RECOMMENDATIONS:  1. Continue ECG telemetry monitoring. 2.  2D echocardiography. 3.  If vasodepressor syncope is the most likely diagnosis, would  recommend avoidance type behaviors. Thanks for the opportunity to assist in the care of the patient. Please  contact me, if you have any questions regarding his evaluation.         Zaki De Jesus MD    D: 08/12/2021 13:27:51       T: 08/12/2021 13:32:15     [email or web address]  Job#: [id number]     Doc#: [id number]    CC:

## 2021-08-12 NOTE — CONSULTS
alcohol  CAFFEINE ABUSE:  No  DRUGS:  Never used recreational drugs  LIFESTYLE: healthy, active    MARITAL STATUS:    OCCUPATION:  Retired     Family History:        Problem Relation Age of Onset    Cancer Mother         colon ca       REVIEW OF SYSTEMS:      Constitutional:  No night sweats, headaches, weight loss. Eyes:  No glaucoma, cataracts. ENMT:  No nosebleeds, deviated septum. Cardiac:  No arrhythmias, chest pain. Vascular:  No claudication, varicosities. GI:  No PUD, heartburn. :  No kidney stones, frequent UTIs  Musculoskeletal:  No arthritis, gout. Respiratory:  No SOB, emphysema, asthma. Integumentary:  No dermatitis, itching, rash. Neurological:  No stroke, TIAs, seizures. Psychiatric:  No depression, anxiety. Endocrine: No diabetes, thyroid issues. Hematologic:  No bleeding, easy bruising. Immunologic:  No known cancer, steroid therapies. PHYSICAL EXAM:    VITALS:  /77   Pulse 59   Temp 97.6 °F (36.4 °C) (Oral)   Resp 16   Ht 6' 2\" (1.88 m)   Wt 195 lb 12.8 oz (88.8 kg)   SpO2 95%   BMI 25.14 kg/m²     Constitutional:  Well developed and nourished male. No acute distress. Eyes:  lids and lashes normal, pupils equal, round and reactive to light, extra ocular muscles intact, sclera clear, conjunctiva normal    Head/ENT: normal teeth, gums, & palate. Moist mucus membranes. No cyanosis or pallor. Neck:  supple, symmetrical, trachea midline, no lymphadenopathy, no jugular venous distension, no carotid bruits and MASSES:  no masses. No thyromegaly. Lungs:  no increased work of breathing, good air exchange, no retractions and clear to auscultation. No tactile fremitus. Cardiovascular:  regular rate and rhythm, S1, S2 normal, no murmur, click, rub or gallop. Apical impulse in 5th intercostal space. Pulses:  Right dorsalis pedis 2, Left dorsalis pedis 2, Right posterior tibial 2, Left Posterior tibial 2, Right radial 2, and Left radial 2. Abdomen:  normal bowel sounds, non-tender, aorta normal and bruits absent. No hepatosplenomegaly or masses. Musculoskeletal:  Back is straight and non-tender, full ROM of upper and lower extremities. No kyphosis or scoliosis. Extremities:  Warm, pink, no clubbing, cyanosis, petechiae, ischemia, or deformities. No peripheral edema. Skin: no rashes, no ecchymoses, no wounds    Neurological/Psychiatric: oriented, no focal deficits    DATA:  EK21 Sinus bradycardia with occasional PVCs    CBC with Differential:    Lab Results   Component Value Date    WBC 6.5 2021    RBC 5.24 2021    HGB 16.1 2021    HCT 46.4 2021     2021    MCV 88.6 2021    MCH 30.7 2021    MCHC 34.7 2021    RDW 13.9 2021    LYMPHOPCT 31.4 2021    MONOPCT 9.2 2021    BASOPCT 0.5 2021    MONOSABS 0.6 2021    LYMPHSABS 2.0 2021    EOSABS 0.1 2021    BASOSABS 0.0 2021     CMP:    Lab Results   Component Value Date     2021    K 3.8 2021     2021    CO2 23 2021    BUN 19 2021    CREATININE 1.1 2021    GFRAA >60 2021    AGRATIO 1.2 2019    LABGLOM >60 2021    GLUCOSE 97 2021    PROT 7.4 2019    LABALBU 4.1 2019    CALCIUM 8.8 2021    BILITOT 0.5 2019    ALKPHOS 28 2019    AST 19 2019    ALT 15 2019     Troponin:    Lab Results   Component Value Date    TROPONINI <0.01 2021       CXRAY:  21     No evidence of an acute cardiopulmonary process. CT Chest Abdomen Pelvis: 21  Impression   No aortic dissection.       4.1 cm saccular aneurysm of the abdominal aorta just above the bifurcation.       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       Dilation of the ascending aorta up to 4.7 cm       Recommend vascular surgical consultation for the above.        ASSESSMENT AND

## 2021-10-21 ENCOUNTER — TELEPHONE (OUTPATIENT)
Dept: CARDIOLOGY CLINIC | Age: 81
End: 2021-10-21

## 2021-10-21 NOTE — TELEPHONE ENCOUNTER
Pt had to cancel his apt with [facility] today because he is sick. Pt wanted to reschedule but there is nothing available for the rest of the year. Please call pt and let him know if he can be worked in somewhere. Pt can be reached @ [phone]. If no answer please leave detailed message.

## 2022-03-03 ENCOUNTER — TELEPHONE (OUTPATIENT)
Dept: CARDIOTHORACIC SURGERY | Age: 82
End: 2022-03-03

## 2022-03-03 NOTE — TELEPHONE ENCOUNTER
Surveillance pt for ascending aortic aneurysm due for CT scan without contrast. Attempts to reach pt per  unsuccessful. Attempted to reach pt today. [facility] requesting call back to clarify pt intention of continuing surveillance or not.  Await return call.  Rajni Guardado

## 2022-03-14 ENCOUNTER — TELEPHONE (OUTPATIENT)
Dept: CARDIOTHORACIC SURGERY | Age: 82
End: 2022-03-14

## 2022-03-14 NOTE — TELEPHONE ENCOUNTER
Third attempt to reach pt to sched CT chest to monitor his TAA. [facility] requesting call back to sched or decline sched of CT chest. If no response will send a final letter.

## 2022-08-08 ENCOUNTER — TELEPHONE (OUTPATIENT)
Dept: CARDIOTHORACIC SURGERY | Age: 82
End: 2022-08-08

## 2022-08-08 NOTE — TELEPHONE ENCOUNTER
Spoke with pt regarding scheduling his CT scan of his chest for TAA surveillance. States Dr. Mena Ferguson monitors his aneurysm and orders the CT scans. Will remove pt from surveillance.